# Patient Record
(demographics unavailable — no encounter records)

---

## 2017-02-10 NOTE — MR
MRI Abdomen, Without and With Contrast 



Indication:  66-year-old woman with autoimmune hepatitis. Indeterminate proteinaceous cyst left kidne
y.



Technique:  Axial single shot fast spin echo, axial and coronal 2-D T2-weighted FIESTA, axial T1 dual
 echo, pre- and postcontrast dynamic T1 fat-suppressed LAVA imaging, and diffusion-weighted imaging i
n the axial plane.  9 mL of Gadavist were uneventfully intravenously administered.



Comparison:  CT of the abdomen and pelvis dated September 9, 2016.



Findings:  Small volume ascites is present throughout the upper abdomen and to a lesser degree the lo
w pelvis. The ascites minimally degrades the overall quality of the study and T2-weighted imaging of 
the bile ducts.



Minimal cirrhosis, evidenced by scalloping of the liver contour and hypertrophy of the lateral segmen
t is unchanged since September 2016. No hepatic steatosis or focal liver lesion has developed. The dy
namic imaging reveals mild heterogeneous enhancement paralleling the portal triads suggesting underly
ing cholangiohepatitis.



Beaded dilated segmental branches of the intrahepatic biliary system are unchanged since September 20
16 and have features suggestive of sclerosing cholangitis. Mild stricture of the left hepatic duct ne
ar the confluence with the right, resulting in preferential dilatation of the left hepatic ducts is u
nchanged. No discernible mass or restricted diffusion at the site of the stenosis. No intraluminal st
one. The common bile duct has normal caliber (4 mm) and normally tapers to the papilla. The gallbladd
er is absent. Surgical clips in the gallbladder fossa and along the posterior surface of the liver re
sult in susceptibility artifact and partially obscure the liver hilum on the postcontrast weighted im
aging.



The portal venous system remains patent. Portosystemic collaterals including gastric varices, diffuse
 mesenteric edema, thickening of the ascending colon consistent with portal colopathy, and splenomega
ly are all unchanged. The spleen measures 16 cm craniocaudal x 15.5 x please review 6 cm axially.



The well-circumscribed 2.2 x 2.3 cm nonenhancing proteinaceous cyst in the inferior aspect of the lef
t kidney is unchanged in size since September 2016 and has no associated restricted diffusion. Benign
 focal scarring along the lateral right kidney is unchanged. No hydronephrosis.



The fatty replaced pancreas and normal-sized adrenal glands are unremarkable. No obstruction or adyna
checo ileus.



Small right pleural effusion is new. The benign-appearing hemangioma in the L1 vertebral body is unch
anged.



Impression:

1. Mild cirrhosis and features suggestive of sclerosing cholangitis are similar to September 2016. En
hancement pattern of the liver suggests active inflammation.

2. No evidence of cholangiocarcinoma or hepatoma.

3. Benign-appearing stricture left hepatic duct near the confluence with the right duct is unchanged 
since September 2016. 

4. Portal hypertension evidenced by gastric varices, portosystemic collaterals, ascites, and splenome
gilda has not significantly changed since September 2016. Small volume of ascites on today's study5

5. Probably benign proteinaceous cyst left kidney is unchanged since 2016.